# Patient Record
Sex: MALE | Race: BLACK OR AFRICAN AMERICAN | Employment: FULL TIME
[De-identification: names, ages, dates, MRNs, and addresses within clinical notes are randomized per-mention and may not be internally consistent; named-entity substitution may affect disease eponyms.]

---

## 2024-08-20 ENCOUNTER — HOSPITAL ENCOUNTER (EMERGENCY)
Facility: HOSPITAL | Age: 29
Discharge: HOME | End: 2024-08-20
Attending: STUDENT IN AN ORGANIZED HEALTH CARE EDUCATION/TRAINING PROGRAM

## 2024-08-20 ENCOUNTER — APPOINTMENT (OUTPATIENT)
Facility: HOSPITAL | Age: 29
End: 2024-08-20

## 2024-08-20 VITALS
WEIGHT: 210 LBS | RESPIRATION RATE: 23 BRPM | HEART RATE: 73 BPM | DIASTOLIC BLOOD PRESSURE: 72 MMHG | TEMPERATURE: 97.2 F | BODY MASS INDEX: 26.95 KG/M2 | OXYGEN SATURATION: 97 % | HEIGHT: 74 IN | SYSTOLIC BLOOD PRESSURE: 133 MMHG

## 2024-08-20 DIAGNOSIS — J45.901 EXACERBATION OF ASTHMA, UNSPECIFIED ASTHMA SEVERITY, UNSPECIFIED WHETHER PERSISTENT: Primary | ICD-10-CM

## 2024-08-20 PROCEDURE — 99283 EMERGENCY DEPT VISIT LOW MDM: CPT

## 2024-08-20 PROCEDURE — 6370000000 HC RX 637 (ALT 250 FOR IP): Performed by: STUDENT IN AN ORGANIZED HEALTH CARE EDUCATION/TRAINING PROGRAM

## 2024-08-20 PROCEDURE — 71045 X-RAY EXAM CHEST 1 VIEW: CPT

## 2024-08-20 RX ORDER — ALBUTEROL SULFATE 90 UG/1
2 AEROSOL, METERED RESPIRATORY (INHALATION) 4 TIMES DAILY PRN
Qty: 54 G | Refills: 1 | Status: SHIPPED | OUTPATIENT
Start: 2024-08-20 | End: 2024-08-20

## 2024-08-20 RX ORDER — ALBUTEROL SULFATE 90 UG/1
2 AEROSOL, METERED RESPIRATORY (INHALATION) 4 TIMES DAILY PRN
Qty: 54 G | Refills: 1 | Status: SHIPPED | OUTPATIENT
Start: 2024-08-20

## 2024-08-20 RX ORDER — IPRATROPIUM BROMIDE AND ALBUTEROL SULFATE 2.5; .5 MG/3ML; MG/3ML
1 SOLUTION RESPIRATORY (INHALATION)
Status: COMPLETED | OUTPATIENT
Start: 2024-08-20 | End: 2024-08-20

## 2024-08-20 RX ORDER — PREDNISONE 50 MG/1
50 TABLET ORAL DAILY
Qty: 5 TABLET | Refills: 0 | Status: SHIPPED | OUTPATIENT
Start: 2024-08-20 | End: 2024-08-25

## 2024-08-20 RX ORDER — PREDNISONE 20 MG/1
60 TABLET ORAL
Status: COMPLETED | OUTPATIENT
Start: 2024-08-20 | End: 2024-08-20

## 2024-08-20 RX ADMIN — PREDNISONE 60 MG: 20 TABLET ORAL at 22:59

## 2024-08-20 RX ADMIN — IPRATROPIUM BROMIDE AND ALBUTEROL SULFATE 1 DOSE: .5; 3 SOLUTION RESPIRATORY (INHALATION) at 22:59

## 2024-08-20 ASSESSMENT — ENCOUNTER SYMPTOMS
SORE THROAT: 0
DIARRHEA: 0
CHEST TIGHTNESS: 0
VOMITING: 0
ABDOMINAL PAIN: 0
NAUSEA: 0
SHORTNESS OF BREATH: 0
WHEEZING: 1

## 2024-08-20 ASSESSMENT — PAIN SCALES - GENERAL: PAINLEVEL_OUTOF10: 0

## 2024-08-21 NOTE — ED PROVIDER NOTES
history and social history.      Vital Signs-Reviewed the patient's vital signs.    EKG: All EKG's are interpreted by the Emergency Department Physician who either signs or Co-signs this chart in the absence of a cardiologist.               Interpretation per the Radiologist below, if available at the time of this note:    ED Course: Progress Notes, Reevaluation, and Consults:    Provider Notes (Medical Decision Making):       MDM  Number of Diagnoses or Management Options  Exacerbation of asthma, unspecified asthma severity, unspecified whether persistent  Diagnosis management comments: Patient in no acute distress.  Vital signs stable.  Saturating 97% on room air.  Does have auditory wheezes heard on auscultation.  Patient treated with a DuoNeb breathing treatment and 60 mg oral prednisone.  He is feeling improved.  Chest x-ray showing no acute intrathoracic findings.  Will discharge patient at this time with refill of his albuterol and prescribed prednisone.  Patient instructed on follow-up with PCP.  ED return precautions advised.  Patient verbalizes understanding and agreement with plan.                Procedures          Diagnosis     Clinical Impression:   1. Exacerbation of asthma, unspecified asthma severity, unspecified whether persistent        Disposition: discharged    Disclaimer: Sections of this note are dictated using utilizing voice recognition software.  Minor typographical errors may be present. If questions arise, please do not hesitate to contact me or call our department.          Yvon Pavon, DO  08/20/24 [id number]

## 2024-08-21 NOTE — ED NOTES
Pt presents c/o wheezing and SOB since around 1800. Pt in NAD in triage, states that his inhaler isn't helping with the wheezing.

## 2024-08-21 NOTE — DISCHARGE INSTRUCTIONS
Make a follow-up appointment with your primary care doctor.  Return to the emergency department for any new or worsening symptoms.